# Patient Record
Sex: MALE | Race: BLACK OR AFRICAN AMERICAN | Employment: OTHER | URBAN - NONMETROPOLITAN AREA
[De-identification: names, ages, dates, MRNs, and addresses within clinical notes are randomized per-mention and may not be internally consistent; named-entity substitution may affect disease eponyms.]

---

## 2022-10-17 ENCOUNTER — OFFICE VISIT (OUTPATIENT)
Age: 32
End: 2022-10-17

## 2022-10-17 VITALS
RESPIRATION RATE: 20 BRPM | BODY MASS INDEX: 28.17 KG/M2 | HEIGHT: 72 IN | SYSTOLIC BLOOD PRESSURE: 134 MMHG | HEART RATE: 80 BPM | OXYGEN SATURATION: 95 % | TEMPERATURE: 97.8 F | DIASTOLIC BLOOD PRESSURE: 70 MMHG | WEIGHT: 208 LBS

## 2022-10-17 DIAGNOSIS — A64 STD (MALE): Primary | ICD-10-CM

## 2022-10-17 DIAGNOSIS — Z20.2 STD EXPOSURE: ICD-10-CM

## 2022-10-17 ASSESSMENT — ENCOUNTER SYMPTOMS
DIARRHEA: 0
SINUS PRESSURE: 0
ALLERGIC/IMMUNOLOGIC NEGATIVE: 1
NAUSEA: 0
SINUS PAIN: 0
COUGH: 0
EYE PAIN: 0
ABDOMINAL PAIN: 0
VOMITING: 0
SORE THROAT: 0
SHORTNESS OF BREATH: 0

## 2022-10-17 NOTE — PATIENT INSTRUCTIONS
Urine sent for testing at the hospital for Gonorrhea, chlamydia, and trich. Will call patient with results. Avoid sexual contact until results and/or treatment is completed. Please follow up with PCP or return to clinic as needed. Patient verbalizes understanding and agrees with treatment plan.

## 2022-10-17 NOTE — PROGRESS NOTES
Endocrine: Negative for cold intolerance and heat intolerance. Genitourinary:  Negative for frequency, hematuria and urgency. Musculoskeletal:  Negative for myalgias. Skin:  Negative for rash. Allergic/Immunologic: Negative. Neurological:  Negative for syncope, weakness, light-headedness and headaches. Hematological: Negative. Psychiatric/Behavioral: Negative. Objective    Physical Exam  Vitals and nursing note reviewed. Constitutional:       General: He is not in acute distress. Appearance: Normal appearance. HENT:      Head: Normocephalic and atraumatic. Right Ear: External ear normal.      Left Ear: External ear normal.      Nose: Nose normal.      Mouth/Throat:      Mouth: Mucous membranes are moist.      Pharynx: Oropharynx is clear. Eyes:      Extraocular Movements: Extraocular movements intact. Conjunctiva/sclera: Conjunctivae normal.   Cardiovascular:      Rate and Rhythm: Normal rate and regular rhythm. Pulses: Normal pulses. Heart sounds: Normal heart sounds. Pulmonary:      Effort: Pulmonary effort is normal.      Breath sounds: Normal breath sounds. No wheezing. Abdominal:      General: Abdomen is flat. Bowel sounds are normal. There is no distension. Palpations: Abdomen is soft. Tenderness: There is no abdominal tenderness. Musculoskeletal:         General: Normal range of motion. Cervical back: Normal range of motion and neck supple. No tenderness. Skin:     General: Skin is warm and dry. Findings: No erythema. Neurological:      General: No focal deficit present. Mental Status: He is alert and oriented to person, place, and time. Psychiatric:         Mood and Affect: Mood normal.         Behavior: Behavior normal.       /70   Pulse 80   Temp 97.8 °F (36.6 °C)   Resp 20   Ht 5' 11.5\" (1.816 m)   Wt 208 lb (94.3 kg)   SpO2 95%   BMI 28.61 kg/m²     Assessment         Diagnosis Orders   1.  STD (male) Chlamydia/N. Gonorrhoeae/T. Vaginalis      2. STD exposure  Chlamydia/N. Gonorrhoeae/T. Vaginalis          Plan   Urine sent for testing at the hospital for Gonorrhea, chlamydia, and trich. Will call patient with results. Avoid sexual contact until results and/or treatment is completed. Please follow up with PCP or return to clinic as needed. Patient verbalizes understanding and agrees with treatment plan. Orders Placed This Encounter   Procedures    Chlamydia/N. Gonorrhoeae/T. Vaginalis       No results found for this visit on 10/17/22. No orders of the defined types were placed in this encounter. New Prescriptions    No medications on file        Return if symptoms worsen or fail to improve. Discussed use, benefits, and side effects of any prescribed medications. All patient questions were answered. Patient voiced understanding of care plan. Patient was given educational materials - see patient instructions below. Patient Instructions   Urine sent for testing at the hospital for Gonorrhea, chlamydia, and trich. Will call patient with results. Avoid sexual contact until results and/or treatment is completed. Please follow up with PCP or return to clinic as needed. Patient verbalizes understanding and agrees with treatment plan.       Electronically signed by Arlen Corrigan PA-C on 10/17/2022 at 11:33 AM

## 2022-10-18 LAB
C. TRACHOMATIS DNA ,URINE: NOT DETECTED
N. GONORRHOEAE DNA, URINE: NOT DETECTED
TRICHOMONAS VAGINALIS DNA, URINE: NOT DETECTED

## 2023-05-13 ENCOUNTER — OFFICE VISIT (OUTPATIENT)
Age: 33
End: 2023-05-13
Payer: MEDICAID

## 2023-05-13 VITALS
SYSTOLIC BLOOD PRESSURE: 127 MMHG | BODY MASS INDEX: 29.4 KG/M2 | TEMPERATURE: 98.5 F | HEIGHT: 71 IN | HEART RATE: 67 BPM | DIASTOLIC BLOOD PRESSURE: 82 MMHG | WEIGHT: 210 LBS | RESPIRATION RATE: 19 BRPM | OXYGEN SATURATION: 98 %

## 2023-05-13 DIAGNOSIS — B36.9 FUNGAL SKIN INFECTION: Primary | ICD-10-CM

## 2023-05-13 PROCEDURE — G8427 DOCREV CUR MEDS BY ELIG CLIN: HCPCS

## 2023-05-13 PROCEDURE — 4004F PT TOBACCO SCREEN RCVD TLK: CPT

## 2023-05-13 PROCEDURE — 99213 OFFICE O/P EST LOW 20 MIN: CPT

## 2023-05-13 PROCEDURE — G8419 CALC BMI OUT NRM PARAM NOF/U: HCPCS

## 2023-05-13 RX ORDER — CLOTRIMAZOLE 1 %
CREAM (GRAM) TOPICAL
Qty: 14 G | Refills: 0 | Status: SHIPPED | OUTPATIENT
Start: 2023-05-13 | End: 2023-05-20

## 2023-05-13 RX ORDER — FLUCONAZOLE 150 MG/1
150 TABLET ORAL DAILY
Qty: 3 TABLET | Refills: 0 | Status: SHIPPED | OUTPATIENT
Start: 2023-05-13 | End: 2023-05-16

## 2023-05-13 NOTE — PATIENT INSTRUCTIONS
Wash and dry feet.    File affected skin gently with fingernail file   Apply cream and allow to sit without putting shoes or socks on for at least 1 hour  Repeat twice daily  Monitor for spreading  May take weeks to improve

## 2023-05-13 NOTE — PROGRESS NOTES
:Objective      Physical Exam  Constitutional:       Appearance: Normal appearance. HENT:      Head: Normocephalic and atraumatic. Right Ear: External ear normal.      Left Ear: External ear normal.      Nose: Nose normal.      Mouth/Throat:      Mouth: Mucous membranes are moist.   Eyes:      General:         Right eye: No discharge. Left eye: No discharge. Conjunctiva/sclera: Conjunctivae normal.   Cardiovascular:      Rate and Rhythm: Normal rate and regular rhythm. Pulmonary:      Effort: Pulmonary effort is normal. No respiratory distress. Abdominal:      General: Abdomen is flat. Palpations: Abdomen is soft. Musculoskeletal:         General: Normal range of motion. Cervical back: Normal range of motion. Feet:    Lymphadenopathy:      Cervical: No cervical adenopathy. Skin:     General: Skin is warm and dry. Capillary Refill: Capillary refill takes less than 2 seconds. Findings: No rash. Neurological:      General: No focal deficit present. Mental Status: He is alert. Psychiatric:         Mood and Affect: Mood normal.     /82   Pulse 67   Temp 98.5 °F (36.9 °C)   Resp 19   Ht 5' 11\" (1.803 m)   Wt 210 lb (95.3 kg)   SpO2 98%   BMI 29.29 kg/m²     :Assessment       Diagnosis Orders   1. Fungal skin infection  clotrimazole (LOTRIMIN AF) 1 % cream    fluconazole (DIFLUCAN) 150 MG tablet          :Plan   Single diflucan and topical clotrimazole to area. Encouraged supportive care at home. Return precautions and home care education completed. Patient verbalized understanding. No orders of the defined types were placed in this encounter. No results found for this visit on 05/13/23. No follow-ups on file. Orders Placed This Encounter   Medications    clotrimazole (LOTRIMIN AF) 1 % cream     Sig: Apply topically 2 times daily.      Dispense:  14 g     Refill:  0    fluconazole (DIFLUCAN) 150 MG tablet     Sig: Take 1 tablet by

## 2023-08-03 ENCOUNTER — OFFICE VISIT (OUTPATIENT)
Age: 33
End: 2023-08-03
Payer: MEDICAID

## 2023-08-03 VITALS
TEMPERATURE: 97.5 F | HEART RATE: 84 BPM | OXYGEN SATURATION: 98 % | WEIGHT: 209 LBS | DIASTOLIC BLOOD PRESSURE: 82 MMHG | HEIGHT: 71 IN | BODY MASS INDEX: 29.26 KG/M2 | SYSTOLIC BLOOD PRESSURE: 132 MMHG

## 2023-08-03 DIAGNOSIS — S91.309A WOUND OF FOOT: Primary | ICD-10-CM

## 2023-08-03 DIAGNOSIS — B36.9 FUNGAL SKIN INFECTION: ICD-10-CM

## 2023-08-03 PROCEDURE — 99213 OFFICE O/P EST LOW 20 MIN: CPT

## 2023-08-03 PROCEDURE — 4004F PT TOBACCO SCREEN RCVD TLK: CPT

## 2023-08-03 PROCEDURE — G8427 DOCREV CUR MEDS BY ELIG CLIN: HCPCS

## 2023-08-03 PROCEDURE — G8419 CALC BMI OUT NRM PARAM NOF/U: HCPCS

## 2023-08-03 RX ORDER — CLOTRIMAZOLE 1 %
CREAM (GRAM) TOPICAL
Qty: 60 G | Refills: 1 | Status: SHIPPED | OUTPATIENT
Start: 2023-08-03 | End: 2023-08-10

## 2023-08-03 ASSESSMENT — ENCOUNTER SYMPTOMS
SORE THROAT: 0
EYE DISCHARGE: 0
EYE REDNESS: 0
NAUSEA: 0
VOMITING: 0
FACIAL SWELLING: 0
APNEA: 0
COUGH: 0
DIARRHEA: 0
SHORTNESS OF BREATH: 0
COLOR CHANGE: 0
TROUBLE SWALLOWING: 0
SINUS PRESSURE: 0
ABDOMINAL PAIN: 0
WHEEZING: 0
SINUS PAIN: 0
EYE PAIN: 0

## 2023-08-03 NOTE — PATIENT INSTRUCTIONS
Referral to podiatry initiated. Clotrimazole refilled; continue as prescribed. Continue to keep the foot clean and dry. Follow up with PCP or return to clinic as needed.

## 2023-08-03 NOTE — PROGRESS NOTES
[address]  [address] [place] [address]  Dept: [phone]  Dept Fax: [phone]  Loc: [phone]    Galen Bingham is a 34 y.o. male who presents today for his medical conditions/complaints as noted below. Galen Bingham is complaining of Foot Problem (right)        HPI:   Pt presents with c/o a wound to the right foot located between the 4th and 5th toes that burns. Pt was seen for this in may, was dx with a fungal infection and treated with diflucan and clotrimazole. Pt says s/s have been present x about 6-8 months, got better with clotrimazole, then worsened after he stopped using clotrimazole. Pt says he has been trying to keep the area dry and clean as much as possible. Denies drainage, bleeding, itching, fever, body aches, chills. S/s moderate. stable    Foot Problem  Pertinent negatives include no abdominal pain, arthralgias, chest pain, chills, congestion, coughing, diaphoresis, fatigue, fever, headaches, joint swelling, myalgias, nausea, numbness, rash, sore throat or vomiting. History reviewed. No pertinent past medical history. History reviewed. No pertinent surgical history. History reviewed. No pertinent family history. Social History     Tobacco Use    Smoking status: Every Day     Packs/day: 1.00     Types: Cigarettes    Smokeless tobacco: Never   Substance Use Topics    Alcohol use: Yes     Comment: occ weekends        Current Outpatient Medications   Medication Sig Dispense Refill    clotrimazole (LOTRIMIN AF) 1 % cream Apply topically 2 times daily. 60 g 1     No current facility-administered medications for this visit.        No Known Allergies    Health Maintenance   Topic Date Due    COVID-19 Vaccine (1) Never done    Varicella vaccine (1 of 2 - 2-dose childhood series) Never done    Pneumococcal 0-64 years Vaccine (1 - PCV) Never done    Depression Screen  Never done    HIV screen  Never done    Hepatitis